# Patient Record
Sex: FEMALE | NOT HISPANIC OR LATINO | Employment: FULL TIME | URBAN - NONMETROPOLITAN AREA
[De-identification: names, ages, dates, MRNs, and addresses within clinical notes are randomized per-mention and may not be internally consistent; named-entity substitution may affect disease eponyms.]

---

## 2017-07-20 ENCOUNTER — OFFICE VISIT (OUTPATIENT)
Dept: OTOLARYNGOLOGY | Facility: CLINIC | Age: 57
End: 2017-07-20

## 2017-07-20 VITALS
HEART RATE: 71 BPM | DIASTOLIC BLOOD PRESSURE: 87 MMHG | BODY MASS INDEX: 28.34 KG/M2 | TEMPERATURE: 97.6 F | SYSTOLIC BLOOD PRESSURE: 140 MMHG | WEIGHT: 154 LBS | HEIGHT: 62 IN

## 2017-07-20 DIAGNOSIS — D48.9 NEOPLASM OF UNCERTAIN BEHAVIOR: Primary | ICD-10-CM

## 2017-07-20 PROCEDURE — 99202 OFFICE O/P NEW SF 15 MIN: CPT | Performed by: OTOLARYNGOLOGY

## 2017-07-20 NOTE — PATIENT INSTRUCTIONS
"The risks, benefits and options of the procedure were explained to the patient. The possiblity of a persistent cosmetic defect as well as a \"flap\" or a graft to close the defect was discussed. The patient was instructed to stop all aspirin, NSAIDs and VIT E etc.      The patient was scheduled for a LOCAL in the office.  "

## 2017-07-20 NOTE — PROGRESS NOTES
Name:  Cady Severino  YOB: 1960  Location: [facility]  Location Address: [address] [place] [address], [facility], [address] [place] [address]  Location Phone: [phone]    Chief Complaint  Chief Complaint   Patient presents with   • Skin Lesion     left lower lip, right cheek        History of Present Illness  The patient  is a 57 y.o. female who is referred by Kyra Dickey MD for preoperative evaluation. She complains of a lesion of the right cheek and left lower lip present for several months. The right cheek lesion has been biopsied and demonstrated a blue nevus with increase cellularity. The lip lesion has not been biopsied.                        History reviewed. No pertinent past medical history.    Past Surgical History:   Procedure Laterality Date   • APPENDECTOMY         No current outpatient prescriptions on file.    Penicillins    Family History   Problem Relation Age of Onset   • Cancer Mother        Social History     Social History   • Marital status:      Spouse name: N/A   • Number of children: N/A   • Years of education: N/A     Occupational History   • Not on file.     Social History Main Topics   • Smoking status: Never Smoker   • Smokeless tobacco: Not on file   • Alcohol use No   • Drug use: Defer   • Sexual activity: Not on file     Other Topics Concern   • Not on file     Social History Narrative   • No narrative on file       Review of Systems   Constitutional: Negative.    HENT: Negative.    Eyes: Negative.    Respiratory: Negative.    Cardiovascular: Negative.    Gastrointestinal: Negative.    Endocrine: Negative.    Genitourinary: Negative.    Musculoskeletal: Negative.    Skin:        Right cheek lesion and left lower lip lesion   Allergic/Immunologic: Negative.    Neurological: Negative.    Hematological: Negative.    Psychiatric/Behavioral: Negative.        Vitals:    17 1400   BP: 140/87   Pulse: 71   Temp: 97.6 °F (36.4 °C)       Objective      Physical Exam    CONSTITUTIONAL: well nourished, well-developed, alert, oriented, in no acute distress     COMMUNICATION AND VOICE: able to communicate normally, normal voice quality    HEAD: normocephalic, no lesions, atraumatic, no tenderness, no masses     FACE: appearance normal, 3 mm pigmented lesion of the right inferior malar cheek above the nasolabial fold, no tenderness, no deformities, facial motion symmetric    SALIVARY GLANDS: parotid glands with no tenderness, no swelling, no masses, submandibular glands with normal size, nontender    EYES: ocular motility normal, eyelids normal, orbits normal, no proptosis, conjunctiva normal , pupils equal, round     EARS:  Hearing: response to conversational voice normal bilaterally   External Ears: auricles without lesions  Otoscopic: tympanic membrane appearance normal, no lesions, no perforation, normal mobility, no fluid    NOSE:  External Nose: structure normal, no tenderness on palpation, no nasal discharge, no lesions, no evidence of trauma, nostrils patent   Intranasal Exam: nasal mucosa normal, vestibule within normal limits, inferior turbinate normal, nasal septum midline   Nasopharynx:     ORAL:  Lips: upper without lesion, and lower lip with 3-4 mm pigmented lesion on the left  Teeth:  Gums: gingivae healthy   Oral Mucosa: oral mucosa normal, no mucosal lesions   Floor of Mouth: Warthin’s duct patent, mucosa normal  Tongue: lingual mucosa normal without lesions, normal tongue mobility   Palate: soft and hard palates with normal mucosa and structure  Oropharynx: oropharyngeal mucosa normal    HYPOPHARYNX:   LARYNX:     NECK: neck appearance normal, no masses or tenderness    THYROID: no overt thyromegaly, no tenderness, nodules or mass present on palpation, position midline     LYMPH NODES: no lymphadenopathy    CHEST/RESPIRATORY: respiratory effort normal    CARDIOVASCULAR: rate and rhythm normal, extremities without cyanosis or edema   "    NEUROLOGIC/PSYCHIATRIC: oriented to time, place and person, mood normal, affect appropriate, CN II-XII intact grossly    Assessment/Plan   Cady was seen today for skin lesion.    Diagnoses and all orders for this visit:    Neoplasm of uncertain behavior  Comments:  Right cheek  Left lower lip      * Surgery not found *  No orders of the defined types were placed in this encounter.    Return for postop.       Patient Instructions   The risks, benefits and options of the procedure were explained to the patient. The possiblity of a persistent cosmetic defect as well as a \"flap\" or a graft to close the defect was discussed. The patient was instructed to stop all aspirin, NSAIDs and VIT E etc.      The patient was scheduled for a LOCAL in the office.            "

## 2017-08-21 ENCOUNTER — TELEPHONE (OUTPATIENT)
Dept: OTOLARYNGOLOGY | Facility: CLINIC | Age: 57
End: 2017-08-21

## 2017-08-22 ENCOUNTER — PROCEDURE VISIT (OUTPATIENT)
Dept: OTOLARYNGOLOGY | Facility: CLINIC | Age: 57
End: 2017-08-22

## 2017-08-22 DIAGNOSIS — D48.9 NEOPLASM OF UNCERTAIN BEHAVIOR: ICD-10-CM

## 2017-08-22 DIAGNOSIS — D48.9 NEOPLASM OF UNCERTAIN BEHAVIOR: Primary | ICD-10-CM

## 2017-08-22 PROCEDURE — 11440 EXC FACE-MM B9+MARG 0.5 CM/<: CPT | Performed by: OTOLARYNGOLOGY

## 2017-08-22 PROCEDURE — 40510 PARTIAL EXCISION OF LIP: CPT | Performed by: OTOLARYNGOLOGY

## 2017-08-22 NOTE — PROGRESS NOTES
PROCEDURE NOTE    Cady Severino    DATE OF PROCEDURE: 08/22/2017    PROCEDURE:   Excision of pigmented lesion of the right cheek with simple closure  Plasma of uncertain origin of the left lower lip with simple closure    PREPROCEDURE DIAGNOSIS:   Neoplasm of uncertain origin of the right cheek  Some of uncertain origin of the left lower lip    POSTPROCEDURE DIAGNOSIS:  SAME    ANESTHESIA:   Injected 1% Lidocaine with 1:100,000 parts epinephrine solution - 3 cc    PROCEDURE DESCRIPTION:    The patient was prepped and draped in the usual fashion.  Following the injection of local anesthesia circumferential elliptical excision was accomplished of the lesion of the right cheek without difficulty utilizing a #15 blade.  Wide undermining was performed with curved iris scissors.  There was minimal to no bleeding.    Reapproximation was accomplished with interrupted 5-0 nylon of the cheek.     The patient was prepped and draped in the usual fashion.  Following the injection of local anesthesia circumferential elliptical excision was accomplished of the lesion of the left lower lip without difficulty utilizing a #15 blade.  Wide undermining was performed with curved iris scissors.  There was minimal to no bleeding.    Reapproximation was accomplished with interrupted 5-0 nylon of the left lower lip.     Bacitracin ointment was applied and the procedure terminated.  The patient tolerated the procedure well. There were no complications.

## 2017-09-01 ENCOUNTER — OFFICE VISIT (OUTPATIENT)
Dept: OTOLARYNGOLOGY | Facility: CLINIC | Age: 57
End: 2017-09-01

## 2017-09-01 DIAGNOSIS — D22.9 NEVUS: ICD-10-CM

## 2017-09-01 DIAGNOSIS — R23.8 VENOUS LAKE OF LIP: Primary | ICD-10-CM

## 2017-09-01 PROCEDURE — 99024 POSTOP FOLLOW-UP VISIT: CPT | Performed by: OTOLARYNGOLOGY

## 2017-09-01 NOTE — PROGRESS NOTES
Procedure   Suture Removal  Date/Time: 9/1/2017 12:58 PM  Performed by: RAVINDER NEVILLE  Authorized by: JENNY CSATILLO   Consent: Verbal consent obtained.  Consent given by: patient  Patient identity confirmed: verbally with patient  Body area: head/neck  Comments: Patient presents for suture removal of lip and cheek. The incisions are well healed with no redness or infection noted. Patient notified of pathology